# Patient Record
Sex: FEMALE | Race: WHITE | NOT HISPANIC OR LATINO | Employment: PART TIME | URBAN - METROPOLITAN AREA
[De-identification: names, ages, dates, MRNs, and addresses within clinical notes are randomized per-mention and may not be internally consistent; named-entity substitution may affect disease eponyms.]

---

## 2018-05-23 ENCOUNTER — LAB VISIT (OUTPATIENT)
Dept: LAB | Facility: HOSPITAL | Age: 28
End: 2018-05-23
Attending: INTERNAL MEDICINE
Payer: COMMERCIAL

## 2018-05-23 ENCOUNTER — OFFICE VISIT (OUTPATIENT)
Dept: FAMILY MEDICINE | Facility: CLINIC | Age: 28
End: 2018-05-23
Payer: COMMERCIAL

## 2018-05-23 VITALS
HEART RATE: 82 BPM | SYSTOLIC BLOOD PRESSURE: 100 MMHG | BODY MASS INDEX: 20.97 KG/M2 | HEIGHT: 66 IN | WEIGHT: 130.5 LBS | DIASTOLIC BLOOD PRESSURE: 60 MMHG | TEMPERATURE: 99 F

## 2018-05-23 DIAGNOSIS — E55.9 VITAMIN D DEFICIENCY: ICD-10-CM

## 2018-05-23 DIAGNOSIS — Z87.42 HISTORY OF OVARIAN CYST: ICD-10-CM

## 2018-05-23 DIAGNOSIS — D22.9 NUMEROUS MOLES: ICD-10-CM

## 2018-05-23 DIAGNOSIS — Z11.3 SCREENING FOR STD (SEXUALLY TRANSMITTED DISEASE): ICD-10-CM

## 2018-05-23 DIAGNOSIS — Z12.83 SKIN EXAM, SCREENING FOR CANCER: ICD-10-CM

## 2018-05-23 DIAGNOSIS — N94.6 DYSMENORRHEA: ICD-10-CM

## 2018-05-23 DIAGNOSIS — Z00.00 ANNUAL PHYSICAL EXAM: Primary | ICD-10-CM

## 2018-05-23 DIAGNOSIS — Z00.00 ANNUAL PHYSICAL EXAM: ICD-10-CM

## 2018-05-23 LAB
25(OH)D3+25(OH)D2 SERPL-MCNC: 34 NG/ML
ALBUMIN SERPL BCP-MCNC: 4.3 G/DL
ALP SERPL-CCNC: 43 U/L
ALT SERPL W/O P-5'-P-CCNC: 9 U/L
ANION GAP SERPL CALC-SCNC: 11 MMOL/L
AST SERPL-CCNC: 14 U/L
BASOPHILS # BLD AUTO: 0.06 K/UL
BASOPHILS NFR BLD: 0.6 %
BILIRUB SERPL-MCNC: 1 MG/DL
BUN SERPL-MCNC: 11 MG/DL
C TRACH DNA SPEC QL NAA+PROBE: NOT DETECTED
CALCIUM SERPL-MCNC: 9.3 MG/DL
CHLORIDE SERPL-SCNC: 106 MMOL/L
CHOLEST SERPL-MCNC: 196 MG/DL
CHOLEST/HDLC SERPL: 3.2 {RATIO}
CO2 SERPL-SCNC: 23 MMOL/L
CREAT SERPL-MCNC: 0.9 MG/DL
DIFFERENTIAL METHOD: NORMAL
EOSINOPHIL # BLD AUTO: 0.3 K/UL
EOSINOPHIL NFR BLD: 2.6 %
ERYTHROCYTE [DISTWIDTH] IN BLOOD BY AUTOMATED COUNT: 12.2 %
EST. GFR  (AFRICAN AMERICAN): >60 ML/MIN/1.73 M^2
EST. GFR  (NON AFRICAN AMERICAN): >60 ML/MIN/1.73 M^2
ESTIMATED AVG GLUCOSE: 94 MG/DL
GLUCOSE SERPL-MCNC: 88 MG/DL
HBA1C MFR BLD HPLC: 4.9 %
HCT VFR BLD AUTO: 39.6 %
HDLC SERPL-MCNC: 61 MG/DL
HDLC SERPL: 31.1 %
HGB BLD-MCNC: 13.1 G/DL
IMM GRANULOCYTES # BLD AUTO: 0.02 K/UL
IMM GRANULOCYTES NFR BLD AUTO: 0.2 %
LDLC SERPL CALC-MCNC: 120.6 MG/DL
LYMPHOCYTES # BLD AUTO: 1.9 K/UL
LYMPHOCYTES NFR BLD: 18.5 %
MCH RBC QN AUTO: 30.8 PG
MCHC RBC AUTO-ENTMCNC: 33.1 G/DL
MCV RBC AUTO: 93 FL
MONOCYTES # BLD AUTO: 0.7 K/UL
MONOCYTES NFR BLD: 7.2 %
N GONORRHOEA DNA SPEC QL NAA+PROBE: NOT DETECTED
NEUTROPHILS # BLD AUTO: 7.3 K/UL
NEUTROPHILS NFR BLD: 70.9 %
NONHDLC SERPL-MCNC: 135 MG/DL
NRBC BLD-RTO: 0 /100 WBC
PLATELET # BLD AUTO: 194 K/UL
PMV BLD AUTO: 10 FL
POTASSIUM SERPL-SCNC: 4 MMOL/L
PROT SERPL-MCNC: 7.3 G/DL
RBC # BLD AUTO: 4.25 M/UL
SODIUM SERPL-SCNC: 140 MMOL/L
TRIGL SERPL-MCNC: 72 MG/DL
WBC # BLD AUTO: 10.29 K/UL

## 2018-05-23 PROCEDURE — 86703 HIV-1/HIV-2 1 RESULT ANTBDY: CPT

## 2018-05-23 PROCEDURE — 85025 COMPLETE CBC W/AUTO DIFF WBC: CPT

## 2018-05-23 PROCEDURE — 82306 VITAMIN D 25 HYDROXY: CPT

## 2018-05-23 PROCEDURE — 87491 CHLMYD TRACH DNA AMP PROBE: CPT

## 2018-05-23 PROCEDURE — 36415 COLL VENOUS BLD VENIPUNCTURE: CPT | Mod: PO

## 2018-05-23 PROCEDURE — 83036 HEMOGLOBIN GLYCOSYLATED A1C: CPT

## 2018-05-23 PROCEDURE — 80053 COMPREHEN METABOLIC PANEL: CPT

## 2018-05-23 PROCEDURE — 86592 SYPHILIS TEST NON-TREP QUAL: CPT

## 2018-05-23 PROCEDURE — 99999 PR PBB SHADOW E&M-NEW PATIENT-LVL III: CPT | Mod: PBBFAC,,, | Performed by: INTERNAL MEDICINE

## 2018-05-23 PROCEDURE — 80061 LIPID PANEL: CPT

## 2018-05-23 PROCEDURE — 99385 PREV VISIT NEW AGE 18-39: CPT | Mod: S$GLB,,, | Performed by: INTERNAL MEDICINE

## 2018-05-23 RX ORDER — MULTIVITAMIN
1 TABLET ORAL DAILY
COMMUNITY

## 2018-05-23 NOTE — PROGRESS NOTES
Subjective:        Patient ID: Tanmay Boucher is a 28 y.o. female.    Chief Complaint: Annual Exam    HPI   Tanmay Boucher presents for annual exam and to establish care.    Pt reports hx of ovarian cyst.  She gets regular but painful periods, especially the first 2 days.  She asks about endometriosis evaluation because her sister and GM both have it.  Pt has taken OCPs in the past and didn't like the mood changes and weight gain associated with the pill.  She has not tried other forms of birth control.  She took Aleve for the cramping and that helps.    Pt would like to see a dermatologist for full skin exam and to have some moles examined.    Review of Systems   Constitutional: Negative for activity change and unexpected weight change.   HENT: Negative for ear pain, hearing loss, sore throat and trouble swallowing.    Eyes: Negative for visual disturbance (no glasses or contacts).   Respiratory: Negative for chest tightness and shortness of breath.    Cardiovascular: Negative for chest pain and leg swelling.   Gastrointestinal: Negative for abdominal pain, blood in stool, constipation and diarrhea.        Indigestion   Genitourinary: Negative for difficulty urinating, hematuria, menstrual problem and vaginal discharge.   Musculoskeletal: Positive for back pain.   Skin: Negative for rash.   Allergic/Immunologic: Negative for immunocompromised state.   Neurological: Negative for dizziness and headaches.   Hematological: Does not bruise/bleed easily.   Psychiatric/Behavioral: Negative for dysphoric mood and sleep disturbance. The patient is not nervous/anxious.         - grieving mother's death 3 weeks ago           Objective:        Vitals:    05/23/18 1334   BP: 100/60   Pulse: 82   Temp: 98.5 °F (36.9 °C)     Physical Exam   Constitutional: She is oriented to person, place, and time. She appears well-developed and well-nourished. No distress.   HENT:   Head: Normocephalic and atraumatic.   Right Ear: External ear  normal.   Left Ear: External ear normal.   Nose: Nose normal.   - bilateral ear canals clear, tympanic membranes visualized - normal color and light reflex   Eyes: Conjunctivae and EOM are normal.   Neck: Normal range of motion. Neck supple. No thyromegaly present.   Cardiovascular: Normal rate, regular rhythm and normal heart sounds.    No murmur heard.  Pulmonary/Chest: Effort normal and breath sounds normal. No respiratory distress.   Abdominal: She exhibits no distension.   Musculoskeletal: She exhibits no edema.   Lymphadenopathy:     She has no cervical adenopathy.   Neurological: She is alert and oriented to person, place, and time.   Skin: Skin is warm and dry.   Psychiatric: She has a normal mood and affect. Her behavior is normal. Judgment and thought content normal.   Vitals reviewed.          Assessment:         1. Annual physical exam    2. Skin exam, screening for cancer    3. Numerous moles    4. History of ovarian cyst    5. Dysmenorrhea    6. Vitamin D deficiency    7. Screening for STD (sexually transmitted disease)              Plan:         Tanmay was seen today for annual exam.    Diagnoses and all orders for this visit:    Annual physical exam  - labs, STI screening today  - pt will return for pap smear since she started her period today  - reports last tetanus ~9 years ago, will get booster next year  -     CBC auto differential; Future  -     Comprehensive metabolic panel; Future  -     Lipid panel; Future  -     Hemoglobin A1c; Future    Skin exam, screening for cancer  Numerous moles: referral to derm  -     Ambulatory referral to Dermatology    History of ovarian cyst  Dysmenorrhea: Pt has has US that showed no further cysts after one that ruptured.  Current cycle and last month's cycle were not that bad, pain improves with NSAIDs.  We discussed trying NuvaRing instead of OCP and pt will think about it, let me know if she would like to try this for the severe PMS symptoms.  Counseled pt  treatment for severe PMS and endometriosis both begin with NSAIDs and birth control before recommending laparoscopy to evaluate for endometriosis.    Vitamin D deficiency: Pt is not currently taking a supplement, has required a high dose supplement in the past.  Will check Vitamin D level.  -     Vitamin D; Future    Screening for STD (sexually transmitted disease)  -     C. trachomatis/N. gonorrhoeae by AMP DNA Urine  -     HIV-1 and HIV-2 antibodies; Future  -     RPR; Future        Follow-up for pending lab results.

## 2018-05-24 LAB
HIV 1+2 AB+HIV1 P24 AG SERPL QL IA: NEGATIVE
RPR SER QL: NORMAL

## 2018-05-27 ENCOUNTER — OFFICE VISIT (OUTPATIENT)
Dept: URGENT CARE | Facility: CLINIC | Age: 28
End: 2018-05-27
Payer: COMMERCIAL

## 2018-05-27 VITALS
TEMPERATURE: 98 F | DIASTOLIC BLOOD PRESSURE: 64 MMHG | SYSTOLIC BLOOD PRESSURE: 103 MMHG | HEART RATE: 77 BPM | HEIGHT: 66 IN | OXYGEN SATURATION: 99 % | WEIGHT: 130 LBS | BODY MASS INDEX: 20.89 KG/M2

## 2018-05-27 DIAGNOSIS — Z91.038 ALLERGIC REACTION TO INSECT BITE: Primary | ICD-10-CM

## 2018-05-27 PROCEDURE — 99214 OFFICE O/P EST MOD 30 MIN: CPT | Mod: S$GLB,,, | Performed by: NURSE PRACTITIONER

## 2018-05-27 PROCEDURE — 3008F BODY MASS INDEX DOCD: CPT | Mod: CPTII,S$GLB,, | Performed by: NURSE PRACTITIONER

## 2018-05-27 RX ORDER — HYDROCORTISONE 1 %
CREAM (GRAM) TOPICAL
Qty: 30 G | Refills: 1 | Status: SHIPPED | OUTPATIENT
Start: 2018-05-27 | End: 2023-03-09

## 2018-05-27 RX ORDER — PREDNISONE 20 MG/1
40 TABLET ORAL DAILY
Qty: 10 TABLET | Refills: 0 | Status: SHIPPED | OUTPATIENT
Start: 2018-05-27 | End: 2018-06-01

## 2018-05-27 NOTE — PROGRESS NOTES
"Subjective:       Patient ID: Tanmay Boucher is a 28 y.o. female.    Vitals:  height is 5' 6" (1.676 m) and weight is 59 kg (130 lb). Her oral temperature is 98.3 °F (36.8 °C). Her blood pressure is 103/64 and her pulse is 77. Her oxygen saturation is 99%.     Chief Complaint: Insect Bite    Stung by a bee yesterday      Insect Bite   This is a new problem. The current episode started yesterday. The problem has been unchanged. Associated symptoms include joint swelling. Pertinent negatives include no chills, fever, rash or sore throat. Nothing aggravates the symptoms.     Review of Systems   Constitution: Negative for chills and fever.   HENT: Negative for sore throat.    Respiratory: Negative for shortness of breath.    Skin: Positive for flushing and itching. Negative for rash.   Musculoskeletal: Positive for joint swelling. Negative for joint pain.       Objective:      Physical Exam   Constitutional: She is oriented to person, place, and time. She appears well-developed and well-nourished.   HENT:   Head: Normocephalic and atraumatic. Head is without abrasion, without contusion and without laceration.   Nose: Nose normal.   Eyes: Conjunctivae, EOM and lids are normal. Pupils are equal, round, and reactive to light.   Neck: Trachea normal, full passive range of motion without pain and phonation normal. Neck supple.   Cardiovascular: Normal rate, regular rhythm and normal heart sounds.    Pulmonary/Chest: Effort normal and breath sounds normal. No stridor. No respiratory distress.   Musculoskeletal: Normal range of motion.   Neurological: She is alert and oriented to person, place, and time.   Skin: Skin is warm, dry and intact. Capillary refill takes less than 2 seconds. Rash noted. No abrasion, no bruising, no burn, no ecchymosis, no laceration and no lesion noted. There is erythema.        Psychiatric: She has a normal mood and affect. Her speech is normal and behavior is normal. Judgment and thought content " normal. Cognition and memory are normal.   Nursing note and vitals reviewed.      Assessment:       1. Allergic reaction to insect bite        Plan:       Patient Instructions     Benadryl OTC as directed for 7 days    Claritin, Zyrtec, or Allegra OTC as directed for 7 days      Insect Sting Allergy, Generalized  You are having an allergic reaction to an insect sting. This may occur after a sting by a wasp, honeybee, yellow jacket, or other insect. This may cause an itchy rash and swelling in the face or other parts of the body. A more severe reaction may cause you to feel dizzy, faint, or have trouble breathing or swallowing. Other warning signs are listed below.  Symptoms can include:  · Rash, hives, redness, welts, or blisters in areas other than the sting site  · Itching, burning, stinging, pain in areas other than the sting site  · Dry, flaky, cracking, scaly skin  · Swelling in areas other than the sting site   · Stomach pain or cramps  More severe symptoms include:  · Swelling of the face or lips or drooling  · Trouble swallowing, feeling like your throat is closing  · Trouble breathing, wheezing  · Dizziness or a sudden decrease in blood pressure  · Hoarse voice or trouble speaking  · Severe nausea, vomiting, or diarrhea  · Feeling faint or lightheaded  · Rapid heart rate  Home care  Medicine  The healthcare provider may prescribe medicines to relieve swelling, itching, and pain. Follow the providers instructions when taking these medicines.  · If you had a severe reaction, the provider may prescribe an injectable epinephrine kit. Epinephrine will stop the progression of an allergic reaction. Before you leave the hospital, be sure that you understand when and how to use this medicine.  · Oral diphenhydramine is an over-the-counter antihistamine available at pharmacies and grocery stores. Unless a prescription antihistamine was given, diphenhydramine may be used to reduce itching if large areas of the skin  are involved. It may make you sleepy, so be careful using it in the daytime or when going to school, working, or driving. Note: Dont use diphenhydramine if you have glaucoma or if you are a man with trouble urinating due to an enlarged prostate. There are other antihistamines that cause less drowsiness and are good choices for daytime use. Ask your pharmacist for suggestions.  · Dont use diphenhydramine cream on your skin. It can cause a further reaction in some people.  · Calamine lotion or oatmeal baths sometimes help with itching.  · You may use acetaminophen or ibuprofen to control pain, unless another pain medicine was prescribed. Note: If you have chronic liver or kidney disease or ever had a stomach ulcer or gastrointestinal bleeding, talk with your provider before using these medicines.    General care  Avoid tight clothing and things that heat up your skin (such as hot showers or baths, or direct sunlight). Heat makes the itching worse.  An ice pack will relieve local areas of intense itching and redness. Apply 5 to 10 minutes. To make an ice pack, put ice cubes in a plastic bag that seals at the top. Wrap the bag in a clean, thin towel or cloth. Dont put ice directly on the skin.  Ticks  If you try to remove a tick, do the following:  · Use a set of fine tweezers and  the tick as close to the skin as is possible.  · Pull upwards, using even, steady pressure. Dont jerk or twist the tick. The ticks bodily fluids may contain infection-causing organisms. So dont squeeze, crush, or puncture the body of the tick. Dont use a smoldering match or cigarette, nail polish, petroleum jelly, liquid soap, or kerosene. They may irritate the tick.  · If any mouthparts of the tick remain in the skin, these can be removed with tweezers. If you cant remove the mouth (of a tick) easily with clean tweezers, leave it alone and let the skin heal.  · After the tick is removed, wash the bite area with rubbing alcohol,  iodine, or soap and water.  · Put the tick in a sealed container and completely cover it with alcohol. Never try to kill or crush a tick with your hand or fingers.  Stings  Wasps, yellow jackets, and hornets dont leave a stinger behind. But if a honeybee stings you, a stinger may stay in your skin. The stinger of a honeybee releases a substance that will attract other bees to you. So try to move away from the nest immediately. Once you are away from the nest, then remove the stinger as quickly as possible by:  · Scraping the stinger out with the edge of a dull knife or plastic card (credit card).  · Don't use a tweezer or your fingers to remove the stinger since that may squeeze more toxin from the stinger.  · Wash the affected area with soap and warm water 2 to 3 times a day. Don't break a blister, if present.  · Next apply an ice pack for 5 to 10 minutes. To make an ice pack, put ice cubes in a plastic bag that seals at the top. Wrap the bag in a clean, thin towel or cloth. Dont put ice directly on the skin.  · Contact your healthcare provider and ask what can be used to help decrease the swelling and itching to the affected area.   · To prevent an infection, don't scratch the affected areas. Always check the sting area for signs of an infection: increased redness, swelling, or pain to the affected area.  Preventing future reactions  Future reactions could be worse than this one. So try to avoid situations where you might be stung:  · Don't walk in grass without shoes. Avoid wearing sandals.  · Don't leave food uncovered when eating outside. Sweet treats, watermelon, and ice cream attract insects.  · Don't drink from uncovered sweetened drinks in cans when outside. Insects are attracted to soda drink cans and sometimes crawl inside of them.  · Don't wear bright colored clothes with flowery prints and patterns when outside.  · Dont wear perfume when outside. Smell attracts insects.  · Wear long pants,  long-sleeved shirts, socks, and work gloves when working outside.  · Be aware that honeybees nest in trees. Wasps and yellow jackets nest in the ground, trees or roof eaves. Avoid garbage cans when outside.  Auto-injectable epinephrine  · If you are at high risk for another sting due to where you work or play, or if your reaction included dizziness, fainting or trouble breathing or swallowing, an auto-injectable epinephrine may be prescribed. If not, ask your healthcare provider for one and always carry it with you. Learn how to use the device. If you begin to feel the symptoms of another reaction in the future, use the auto-injectable epinephrine to inject yourself, and then call 911. Don't wait until symptoms become severe.   · Remember that the auto-injectable epinephrine is a rescue medicine only. You still need someone to take you to the hospital or call 911 after you have received the medicine.  Follow-up care  Follow up with your healthcare provider, or as advised if your symptoms do not continue to improve.  Call 911  Call 911 if any of these occur:  · Trouble breathing or swallowing, wheezing   · Cool, moist, pale skin  · Hoarse voice or trouble speaking  · Confused  · Very drowsy or trouble waking up  · Fainting or loss of consciousness  · Rapid heart rate  · Low blood pressure or feeling dizzy or weak  · Feeling of doom  · Severe nausea, vomiting, or diarrhea  · Seizure  · Swelling in the face, eyelids, lips, mouth, throat or tongue  · Drooling  When to seek medical advice  Call your healthcare provider right away if any of the following occur:  · Spreading areas of itching, redness or swelling  · Headache, fever, chills, muscle or joint aching  · Increased pain or swelling  · Signs of infection of the affected area:  ¨ Spreading redness  ¨ Increase in pain or swelling  ¨ Fluid or colored drainage from the affected site  Date Last Reviewed: 3/1/2017  © 8187-0718 [employer]. [address] [place] [address]  [place] [address]. All rights reserved. This information is not intended as a substitute for professional medical care. Always follow your healthcare professional's instructions.              Allergic reaction to insect bite    Other orders  -     predniSONE (DELTASONE) 20 MG tablet; Take 2 tablets (40 mg total) by mouth once daily.  Dispense: 10 tablet; Refill: 0  -     hydrocortisone 1 % cream; Apply to affected area 2 times daily  Dispense: 30 g; Refill: 1

## 2018-05-27 NOTE — PATIENT INSTRUCTIONS
Benadryl OTC as directed for 7 days    Claritin, Zyrtec, or Allegra OTC as directed for 7 days      Insect Sting Allergy, Generalized  You are having an allergic reaction to an insect sting. This may occur after a sting by a wasp, honeybee, yellow jacket, or other insect. This may cause an itchy rash and swelling in the face or other parts of the body. A more severe reaction may cause you to feel dizzy, faint, or have trouble breathing or swallowing. Other warning signs are listed below.  Symptoms can include:  · Rash, hives, redness, welts, or blisters in areas other than the sting site  · Itching, burning, stinging, pain in areas other than the sting site  · Dry, flaky, cracking, scaly skin  · Swelling in areas other than the sting site   · Stomach pain or cramps  More severe symptoms include:  · Swelling of the face or lips or drooling  · Trouble swallowing, feeling like your throat is closing  · Trouble breathing, wheezing  · Dizziness or a sudden decrease in blood pressure  · Hoarse voice or trouble speaking  · Severe nausea, vomiting, or diarrhea  · Feeling faint or lightheaded  · Rapid heart rate  Home care  Medicine  The healthcare provider may prescribe medicines to relieve swelling, itching, and pain. Follow the providers instructions when taking these medicines.  · If you had a severe reaction, the provider may prescribe an injectable epinephrine kit. Epinephrine will stop the progression of an allergic reaction. Before you leave the hospital, be sure that you understand when and how to use this medicine.  · Oral diphenhydramine is an over-the-counter antihistamine available at pharmacies and grocery stores. Unless a prescription antihistamine was given, diphenhydramine may be used to reduce itching if large areas of the skin are involved. It may make you sleepy, so be careful using it in the daytime or when going to school, working, or driving. Note: Dont use diphenhydramine if you have glaucoma or  if you are a man with trouble urinating due to an enlarged prostate. There are other antihistamines that cause less drowsiness and are good choices for daytime use. Ask your pharmacist for suggestions.  · Dont use diphenhydramine cream on your skin. It can cause a further reaction in some people.  · Calamine lotion or oatmeal baths sometimes help with itching.  · You may use acetaminophen or ibuprofen to control pain, unless another pain medicine was prescribed. Note: If you have chronic liver or kidney disease or ever had a stomach ulcer or gastrointestinal bleeding, talk with your provider before using these medicines.    General care  Avoid tight clothing and things that heat up your skin (such as hot showers or baths, or direct sunlight). Heat makes the itching worse.  An ice pack will relieve local areas of intense itching and redness. Apply 5 to 10 minutes. To make an ice pack, put ice cubes in a plastic bag that seals at the top. Wrap the bag in a clean, thin towel or cloth. Dont put ice directly on the skin.  Ticks  If you try to remove a tick, do the following:  · Use a set of fine tweezers and  the tick as close to the skin as is possible.  · Pull upwards, using even, steady pressure. Dont jerk or twist the tick. The ticks bodily fluids may contain infection-causing organisms. So dont squeeze, crush, or puncture the body of the tick. Dont use a smoldering match or cigarette, nail polish, petroleum jelly, liquid soap, or kerosene. They may irritate the tick.  · If any mouthparts of the tick remain in the skin, these can be removed with tweezers. If you cant remove the mouth (of a tick) easily with clean tweezers, leave it alone and let the skin heal.  · After the tick is removed, wash the bite area with rubbing alcohol, iodine, or soap and water.  · Put the tick in a sealed container and completely cover it with alcohol. Never try to kill or crush a tick with your hand or fingers.  Stings  Wasps,  yellow jackets, and hornets dont leave a stinger behind. But if a honeybee stings you, a stinger may stay in your skin. The stinger of a honeybee releases a substance that will attract other bees to you. So try to move away from the nest immediately. Once you are away from the nest, then remove the stinger as quickly as possible by:  · Scraping the stinger out with the edge of a dull knife or plastic card (credit card).  · Don't use a tweezer or your fingers to remove the stinger since that may squeeze more toxin from the stinger.  · Wash the affected area with soap and warm water 2 to 3 times a day. Don't break a blister, if present.  · Next apply an ice pack for 5 to 10 minutes. To make an ice pack, put ice cubes in a plastic bag that seals at the top. Wrap the bag in a clean, thin towel or cloth. Dont put ice directly on the skin.  · Contact your healthcare provider and ask what can be used to help decrease the swelling and itching to the affected area.   · To prevent an infection, don't scratch the affected areas. Always check the sting area for signs of an infection: increased redness, swelling, or pain to the affected area.  Preventing future reactions  Future reactions could be worse than this one. So try to avoid situations where you might be stung:  · Don't walk in grass without shoes. Avoid wearing sandals.  · Don't leave food uncovered when eating outside. Sweet treats, watermelon, and ice cream attract insects.  · Don't drink from uncovered sweetened drinks in cans when outside. Insects are attracted to soda drink cans and sometimes crawl inside of them.  · Don't wear bright colored clothes with flowery prints and patterns when outside.  · Dont wear perfume when outside. Smell attracts insects.  · Wear long pants, long-sleeved shirts, socks, and work gloves when working outside.  · Be aware that honeybees nest in trees. Wasps and yellow jackets nest in the ground, trees or roof eaves. Avoid garbage  cans when outside.  Auto-injectable epinephrine  · If you are at high risk for another sting due to where you work or play, or if your reaction included dizziness, fainting or trouble breathing or swallowing, an auto-injectable epinephrine may be prescribed. If not, ask your healthcare provider for one and always carry it with you. Learn how to use the device. If you begin to feel the symptoms of another reaction in the future, use the auto-injectable epinephrine to inject yourself, and then call 911. Don't wait until symptoms become severe.   · Remember that the auto-injectable epinephrine is a rescue medicine only. You still need someone to take you to the hospital or call 911 after you have received the medicine.  Follow-up care  Follow up with your healthcare provider, or as advised if your symptoms do not continue to improve.  Call 911  Call 911 if any of these occur:  · Trouble breathing or swallowing, wheezing   · Cool, moist, pale skin  · Hoarse voice or trouble speaking  · Confused  · Very drowsy or trouble waking up  · Fainting or loss of consciousness  · Rapid heart rate  · Low blood pressure or feeling dizzy or weak  · Feeling of doom  · Severe nausea, vomiting, or diarrhea  · Seizure  · Swelling in the face, eyelids, lips, mouth, throat or tongue  · Drooling  When to seek medical advice  Call your healthcare provider right away if any of the following occur:  · Spreading areas of itching, redness or swelling  · Headache, fever, chills, muscle or joint aching  · Increased pain or swelling  · Signs of infection of the affected area:  ¨ Spreading redness  ¨ Increase in pain or swelling  ¨ Fluid or colored drainage from the affected site  Date Last Reviewed: 3/1/2017  © 4064-6844 [employer]. [address] [place] [address]. All rights reserved. This information is not intended as a substitute for professional medical care. Always follow your healthcare professional's  instructions.

## 2018-05-30 ENCOUNTER — OFFICE VISIT (OUTPATIENT)
Dept: FAMILY MEDICINE | Facility: CLINIC | Age: 28
End: 2018-05-30
Payer: COMMERCIAL

## 2018-05-30 VITALS
TEMPERATURE: 98 F | WEIGHT: 121.5 LBS | BODY MASS INDEX: 19.53 KG/M2 | HEIGHT: 66 IN | RESPIRATION RATE: 20 BRPM | SYSTOLIC BLOOD PRESSURE: 100 MMHG | DIASTOLIC BLOOD PRESSURE: 68 MMHG

## 2018-05-30 DIAGNOSIS — Z23 NEED FOR TDAP VACCINATION: ICD-10-CM

## 2018-05-30 DIAGNOSIS — N89.8 VAGINAL ITCHING: ICD-10-CM

## 2018-05-30 DIAGNOSIS — Z12.4 SCREENING FOR CERVICAL CANCER: Primary | ICD-10-CM

## 2018-05-30 LAB
CANDIDA RRNA VAG QL PROBE: NEGATIVE
G VAGINALIS RRNA GENITAL QL PROBE: NEGATIVE
T VAGINALIS RRNA GENITAL QL PROBE: NEGATIVE

## 2018-05-30 PROCEDURE — 99999 PR PBB SHADOW E&M-EST. PATIENT-LVL III: CPT | Mod: PBBFAC,,, | Performed by: INTERNAL MEDICINE

## 2018-05-30 PROCEDURE — 3008F BODY MASS INDEX DOCD: CPT | Mod: CPTII,S$GLB,, | Performed by: INTERNAL MEDICINE

## 2018-05-30 PROCEDURE — 87510 GARDNER VAG DNA DIR PROBE: CPT

## 2018-05-30 PROCEDURE — 90471 IMMUNIZATION ADMIN: CPT | Mod: S$GLB,,, | Performed by: INTERNAL MEDICINE

## 2018-05-30 PROCEDURE — 90715 TDAP VACCINE 7 YRS/> IM: CPT | Mod: S$GLB,,, | Performed by: INTERNAL MEDICINE

## 2018-05-30 PROCEDURE — 87480 CANDIDA DNA DIR PROBE: CPT

## 2018-05-30 PROCEDURE — 88141 CYTOPATH C/V INTERPRET: CPT | Mod: ,,, | Performed by: PATHOLOGY

## 2018-05-30 PROCEDURE — 99213 OFFICE O/P EST LOW 20 MIN: CPT | Mod: 25,S$GLB,, | Performed by: INTERNAL MEDICINE

## 2018-05-30 PROCEDURE — 88175 CYTOPATH C/V AUTO FLUID REDO: CPT | Performed by: PATHOLOGY

## 2018-05-30 PROCEDURE — 87624 HPV HI-RISK TYP POOLED RSLT: CPT

## 2018-05-30 NOTE — PROGRESS NOTES
Two patient identifiers verified.  Allergies reviewed.Tdap   IM administered to Left deltoid per MD order.  Patient tolerated injection well; no redness, bleeding, or bruising noted to injection site.  Patient instructed to remain in clinic setting for 15 minutes.  Verbalizes understanding.

## 2018-05-30 NOTE — PROGRESS NOTES
Subjective:        Patient ID: Tanmay Boucher is a 28 y.o. female.    Chief Complaint: Gynecologic Exam and Vaginal Itching    HPI   Tanmay Boucher presents for cervical CA screening.  Pt was on her period during annual physical exam and deferred screening that day.    Pt notes some vaginal itching that has improved/resolved.  No abnormal discharge.  Baseline vaginal discharge is off white or light yellow-green.    Review of Systems  as per HPI      Objective:        Vitals:    05/30/18 1049   BP: 100/68   Resp: 20   Temp: 98.2 °F (36.8 °C)     Physical Exam   Constitutional: She appears well-developed and well-nourished. No distress.   Genitourinary: Pelvic exam was performed with patient supine. There is no rash, tenderness, lesion or injury on the right labia. There is no rash, tenderness, lesion or injury on the left labia. Cervix exhibits no friability. No erythema, tenderness or bleeding in the vagina. No foreign body in the vagina. No signs of injury around the vagina. Vaginal discharge (light yellow discharge) found.   Vitals reviewed.          Assessment:         1. Screening for cervical cancer    2. Vaginal itching    3. Need for Tdap vaccination              Plan:         Tanmay was seen today for gynecologic exam and vaginal itching.    Diagnoses and all orders for this visit:    Screening for cervical cancer: No hx of abnormal pap smears.  Reviewed cervical CA screening guidelines.  -     Liquid-based pap smear, screening    Vaginal itching: Discharge on exam may be physiologic.  Will hold tx pending results of swab.  -     Vaginosis Screen by DNA Probe      Vaccinations: Pt brought vaccine records.  Last tetanus 2007, will get booster today.  Copy of vaccination record will be sent to scan into pt's chart.

## 2018-06-01 ENCOUNTER — PATIENT MESSAGE (OUTPATIENT)
Dept: FAMILY MEDICINE | Facility: CLINIC | Age: 28
End: 2018-06-01

## 2018-06-06 ENCOUNTER — TELEPHONE (OUTPATIENT)
Dept: DERMATOLOGY | Facility: CLINIC | Age: 28
End: 2018-06-06

## 2018-06-06 NOTE — TELEPHONE ENCOUNTER
Called PT and informed her that Dr. CARRION would not be in clinic on her appointment day 6/29. Offered to move PT to 6/22 but she states that she would be out of town so she was moved to 7/2.

## 2018-06-14 ENCOUNTER — TELEPHONE (OUTPATIENT)
Dept: FAMILY MEDICINE | Facility: CLINIC | Age: 28
End: 2018-06-14

## 2018-06-14 DIAGNOSIS — R87.610 ASCUS OF CERVIX WITH NEGATIVE HIGH RISK HPV: ICD-10-CM

## 2018-06-14 LAB
HPV HR 12 DNA CVX QL NAA+PROBE: NEGATIVE
HPV16 AG SPEC QL: NEGATIVE
HPV18 DNA SPEC QL NAA+PROBE: NEGATIVE

## 2018-06-15 ENCOUNTER — PATIENT MESSAGE (OUTPATIENT)
Dept: FAMILY MEDICINE | Facility: CLINIC | Age: 28
End: 2018-06-15

## 2018-07-02 ENCOUNTER — INITIAL CONSULT (OUTPATIENT)
Dept: DERMATOLOGY | Facility: CLINIC | Age: 28
End: 2018-07-02
Payer: COMMERCIAL

## 2018-07-02 DIAGNOSIS — D22.70 MULTIPLE BENIGN MELANOCYTIC NEVI OF UPPER EXTREMITY, LOWER EXTREMITY, AND TRUNK: ICD-10-CM

## 2018-07-02 DIAGNOSIS — D22.60 MULTIPLE BENIGN MELANOCYTIC NEVI OF UPPER EXTREMITY, LOWER EXTREMITY, AND TRUNK: ICD-10-CM

## 2018-07-02 DIAGNOSIS — L81.1 MELASMA: Primary | ICD-10-CM

## 2018-07-02 DIAGNOSIS — Z80.8 FAMILY HISTORY OF NONMELANOMA SKIN CANCER: ICD-10-CM

## 2018-07-02 DIAGNOSIS — D22.5 MULTIPLE BENIGN MELANOCYTIC NEVI OF UPPER EXTREMITY, LOWER EXTREMITY, AND TRUNK: ICD-10-CM

## 2018-07-02 DIAGNOSIS — D48.5 NEOPLASM OF UNCERTAIN BEHAVIOR OF SKIN: ICD-10-CM

## 2018-07-02 DIAGNOSIS — L81.4 LENTIGO: ICD-10-CM

## 2018-07-02 PROCEDURE — 99203 OFFICE O/P NEW LOW 30 MIN: CPT | Mod: S$GLB,,, | Performed by: DERMATOLOGY

## 2018-07-02 NOTE — PROGRESS NOTES
Subjective:       Patient ID:  Tanmay Boucher is a 28 y.o. female who presents for   Chief Complaint   Patient presents with    Growth     R upper inner thigh     Growth  - Initial  Affected locations: right upper leg  Duration: 20 years  Signs and Symptoms: grown with her.  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   Skin: Positive for daily sunscreen use (SPF 50 on face), activity-related sunscreen use and recent sunburn (mild).   Hematologic/Lymphatic: Bruises/bleeds easily (bruising, unknown cause).        Objective:    Physical Exam   Constitutional: She appears well-developed and well-nourished. No distress.   Neurological: She is alert and oriented to person, place, and time. She is not disoriented.   Psychiatric: She has a normal mood and affect.   Skin:   Areas Examined (abnormalities noted in diagram):   Scalp / Hair Palpated and Inspected  Head / Face Inspection Performed  Neck Inspection Performed  Chest / Axilla Inspection Performed  Abdomen Inspection Performed  Genitals / Buttocks / Groin Inspection Performed  Back Inspection Performed  RUE Inspected  LUE Inspection Performed  RLE Inspected  LLE Inspection Performed  Nails and Digits Inspection Performed                   Diagram Legend     Erythematous scaling macule/papule c/w actinic keratosis       Vascular papule c/w angioma      Pigmented verrucoid papule/plaque c/w seborrheic keratosis      Yellow umbilicated papule c/w sebaceous hyperplasia      Irregularly shaped tan macule c/w lentigo     1-2 mm smooth white papules consistent with Milia      Movable subcutaneous cyst with punctum c/w epidermal inclusion cyst      Subcutaneous movable cyst c/w pilar cyst      Firm pink to brown papule c/w dermatofibroma      Pedunculated fleshy papule(s) c/w skin  tag(s)      Evenly pigmented macule c/w junctional nevus     Mildly variegated pigmented, slightly irregular-bordered macule c/w mildly atypical nevus      Flesh colored to evenly pigmented papule c/w intradermal nevus       Pink pearly papule/plaque c/w basal cell carcinoma      Erythematous hyperkeratotic cursted plaque c/w SCC      Surgical scar with no sign of skin cancer recurrence      Open and closed comedones      Inflammatory papules and pustules      Verrucoid papule consistent consistent with wart     Erythematous eczematous patches and plaques     Dystrophic onycholytic nail with subungual debris c/w onychomycosis     Umbilicated papule    Erythematous-base heme-crusted tan verrucoid plaque consistent with inflamed seborrheic keratosis     Erythematous Silvery Scaling Plaque c/w Psoriasis     See annotation      Assessment / Plan:        Melasma  Discussed hormonal, solar, and genetic etiology. Emphasized difficulty in treatment and maintaining improvement. Discussed importance of diligent daily sun protection.  Patient instructed on importance of daily sun protection with a broad-spectrum sunscreen of SPF 30 or higher. Sun avoidance and topical protection discussed.   Patient encouraged to wear hat for all outdoor exposure.     Lentigo  These are benign sun spots which should be monitored for changes. Daily sun protection will reduce the number of new lesions.   Patient instructed in importance of daily broad-spectrum sunscreen use with SPF of at least 30. Sun avoidance and topical protection/protective clothing discussed.    Multiple benign melanocytic nevi of upper extremity, lower extremity, and trunk  Multiple benign-appearing nevi present on exam today. Reassurance provided. Counseled patient to periodically examine moles and return to clinic if any moles change or become symptomatic.    Neoplasm of uncertain behavior of skin  Recommended biopsies to rule out dysplastic nevi on left upper back and  left buttock. Will defer until after her tubing trip this week.    Family history of nonmelanoma skin cancer  Total body skin examination performed today including at least 12 points as noted in physical examination. Suspicious lesions noted above will need to be biopsied.       Follow-up in about 1 week (around 7/9/2018) for biopsies.

## 2018-07-02 NOTE — LETTER
July 2, 2018      Eleonora Pedroza MD  [address] [facility] [address]           [facility]  [address] [place] [id number]  Phone: [phone]  Fax: [phone]          Patient: Tanmay Boucher   MR Number: [id number]   YOB: 1990   Date of Visit: 7/2/2018       Dear Dr. Eleonora Pedroza:    Thank you for referring Tanmay Boucher to me for evaluation. Attached you will find relevant portions of my assessment and plan of care.    If you have questions, please do not hesitate to call me. I look forward to following Tanmay Boucher along with you.    Sincerely,    Tavia Mcclelland MD    Enclosure  CC:  No Recipients    If you would like to receive this communication electronically, please contact [email or web address] or [phone] to request more information on [employer] Link access.    For providers and/or their staff who would like to refer a patient to [facility], please contact us through our one-stop-shop provider referral line, [facility], [place], at [phone].    If you feel you have received this communication in error or would no longer like to receive these types of communications, please e-mail [email or web address]

## 2018-07-10 ENCOUNTER — OFFICE VISIT (OUTPATIENT)
Dept: DERMATOLOGY | Facility: CLINIC | Age: 28
End: 2018-07-10
Payer: COMMERCIAL

## 2018-07-10 DIAGNOSIS — D48.5 NEOPLASM OF UNCERTAIN BEHAVIOR OF SKIN: Primary | ICD-10-CM

## 2018-07-10 PROCEDURE — 11100 PR BIOPSY OF SKIN LESION: CPT | Mod: S$GLB,,, | Performed by: DERMATOLOGY

## 2018-07-10 PROCEDURE — 88305 TISSUE EXAM BY PATHOLOGIST: CPT | Performed by: PATHOLOGY

## 2018-07-10 PROCEDURE — 11101 PR BIOPSY, EACH ADDED LESION: CPT | Mod: S$GLB,,, | Performed by: DERMATOLOGY

## 2018-07-10 PROCEDURE — 99499 UNLISTED E&M SERVICE: CPT | Mod: S$GLB,,, | Performed by: DERMATOLOGY

## 2018-07-10 NOTE — PROGRESS NOTES
Subjective:       Patient ID:  Tanmay Boucher is a 28 y.o. female who presents for   Chief Complaint   Patient presents with    Mole     back, biopsy      Pt returns today for biopsies of two abnormal moles.       Mole  - Initial  Affected locations: back and left buttock  Duration: 8 years  Signs / symptoms: asymptomatic  Severity: mild  Timing: constant  Aggravated by: nothing  Relieving factors/Treatments tried: nothing  Improvement on treatment: no relief      Review of Systems     Objective:    Physical Exam   Constitutional: She appears well-developed and well-nourished. No distress.   Neurological: She is alert and oriented to person, place, and time. She is not disoriented.   Psychiatric: She has a normal mood and affect.   Skin:   Areas Examined (abnormalities noted in diagram):   Genitals / Buttocks / Groin Inspection Performed  Back Inspection Performed              Diagram Legend     Erythematous scaling macule/papule c/w actinic keratosis       Vascular papule c/w angioma      Pigmented verrucoid papule/plaque c/w seborrheic keratosis      Yellow umbilicated papule c/w sebaceous hyperplasia      Irregularly shaped tan macule c/w lentigo     1-2 mm smooth white papules consistent with Milia      Movable subcutaneous cyst with punctum c/w epidermal inclusion cyst      Subcutaneous movable cyst c/w pilar cyst      Firm pink to brown papule c/w dermatofibroma      Pedunculated fleshy papule(s) c/w skin tag(s)      Evenly pigmented macule c/w junctional nevus     Mildly variegated pigmented, slightly irregular-bordered macule c/w mildly atypical nevus      Flesh colored to evenly pigmented papule c/w intradermal nevus       Pink pearly papule/plaque c/w basal cell carcinoma      Erythematous hyperkeratotic cursted plaque c/w SCC      Surgical scar with no sign of skin cancer recurrence      Open and closed comedones      Inflammatory papules and pustules      Verrucoid papule consistent consistent with wart      Erythematous eczematous patches and plaques     Dystrophic onycholytic nail with subungual debris c/w onychomycosis     Umbilicated papule    Erythematous-base heme-crusted tan verrucoid plaque consistent with inflamed seborrheic keratosis     Erythematous Silvery Scaling Plaque c/w Psoriasis     See annotation      Assessment / Plan:      Pathology Orders:     Normal Orders This Visit    Tissue Specimen To Pathology, Dermatology     Questions:    Directional Terms:  Other(comment)    Clinical information:  r/o dysplastic nevus    Specific Site:  left upper back    Tissue Specimen To Pathology, Dermatology     Questions:    Directional Terms:  Other(comment)    Clinical information:  r/o dysplastic nevus    Specific Site:  left buttock        Neoplasm of uncertain behavior of skin  -     Tissue Specimen To Pathology, Dermatology  -     Tissue Specimen To Pathology, Dermatology    Shave biopsy procedure note x 2:  Risk, benefits, and alternatives of biopsy are discussed with the patient, including risk of infection, scar, recurrence, and need for additional treatment of site. The patient agrees to the procedure by verbal consent. The area is marked and prepped with alcohol.  Approximately 2 mL of lidocaine 1% with epinephrine is used for local anesthesia. A sharp blade is used to remove the lesion. The specimen is sent for pathology. Hemostasis is obtained with aluminum chloride and/or monopolar hyfrecation if needed. The area is then dressed and bandaged. The patient tolerated the procedure well without adverse event. Written instructions on wound care were given and were reviewed with the patient, who is to call for any signs of bleeding or infection. The patient will be notified of the pathology results.      Follow-up in about 1 year (around 7/10/2019) for TBSE, or sooner if any new problems or changing lesions.

## 2018-07-20 ENCOUNTER — TELEPHONE (OUTPATIENT)
Dept: DERMATOLOGY | Facility: CLINIC | Age: 28
End: 2018-07-20

## 2018-07-20 NOTE — TELEPHONE ENCOUNTER
Spoke to pt., bx results given per Dr Mcclelland. Follow up appointment made for October.      ----- Message from Tavia Mcclelland MD sent at 7/19/2018  6:06 PM CDT -----  Needs 3 mo appt to recheck left upper back.  KK    FINAL PATHOLOGIC DIAGNOSIS  [facility] DIAGNOSIS:  1. LEFT UPPER BACK:  COMPOUND DYSPLASTIC NEVUS, MILD ATYPIA.  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  THE LESION INVOLVES THE DEEP MARGIN OF THE BIOPSY.  2. LEFT BUTTOCK:  JUNCTIONAL DYSPLASTIC NEVUS, LENTIGINOUS FEATURES, MILD ATYPIA.  THE MARGINS ARE FREE IN THE CROSS SECTIONS EXAMINED.

## 2018-08-29 ENCOUNTER — PATIENT MESSAGE (OUTPATIENT)
Dept: FAMILY MEDICINE | Facility: CLINIC | Age: 28
End: 2018-08-29

## 2018-08-29 DIAGNOSIS — F43.21 GRIEF REACTION: Primary | ICD-10-CM

## 2018-10-10 ENCOUNTER — OFFICE VISIT (OUTPATIENT)
Dept: FAMILY MEDICINE | Facility: CLINIC | Age: 28
End: 2018-10-10
Payer: COMMERCIAL

## 2018-10-10 VITALS
SYSTOLIC BLOOD PRESSURE: 100 MMHG | TEMPERATURE: 98 F | BODY MASS INDEX: 18.55 KG/M2 | HEIGHT: 67 IN | DIASTOLIC BLOOD PRESSURE: 60 MMHG | RESPIRATION RATE: 16 BRPM | WEIGHT: 118.19 LBS

## 2018-10-10 DIAGNOSIS — N89.8 VAGINAL DISCHARGE: ICD-10-CM

## 2018-10-10 DIAGNOSIS — Z23 NEED FOR PROPHYLACTIC VACCINATION AND INOCULATION AGAINST INFLUENZA: ICD-10-CM

## 2018-10-10 DIAGNOSIS — R30.0 DYSURIA: Primary | ICD-10-CM

## 2018-10-10 LAB
BILIRUB SERPL-MCNC: ABNORMAL MG/DL
BLOOD URINE, POC: ABNORMAL
CANDIDA RRNA VAG QL PROBE: NEGATIVE
COLOR, POC UA: ABNORMAL
G VAGINALIS RRNA GENITAL QL PROBE: POSITIVE
GLUCOSE UR QL STRIP: NORMAL
KETONES UR QL STRIP: ABNORMAL
LEUKOCYTE ESTERASE URINE, POC: ABNORMAL
NITRITE, POC UA: ABNORMAL
PH, POC UA: 8
PROTEIN, POC: ABNORMAL
SPECIFIC GRAVITY, POC UA: 1
T VAGINALIS RRNA GENITAL QL PROBE: NEGATIVE
UROBILINOGEN, POC UA: NORMAL

## 2018-10-10 PROCEDURE — 87660 TRICHOMONAS VAGIN DIR PROBE: CPT

## 2018-10-10 PROCEDURE — 90471 IMMUNIZATION ADMIN: CPT | Mod: S$GLB,,, | Performed by: INTERNAL MEDICINE

## 2018-10-10 PROCEDURE — 99214 OFFICE O/P EST MOD 30 MIN: CPT | Mod: 25,S$GLB,, | Performed by: INTERNAL MEDICINE

## 2018-10-10 PROCEDURE — 99999 PR PBB SHADOW E&M-EST. PATIENT-LVL IV: CPT | Mod: PBBFAC,,, | Performed by: INTERNAL MEDICINE

## 2018-10-10 PROCEDURE — 81002 URINALYSIS NONAUTO W/O SCOPE: CPT | Mod: S$GLB,,, | Performed by: INTERNAL MEDICINE

## 2018-10-10 PROCEDURE — 87491 CHLMYD TRACH DNA AMP PROBE: CPT

## 2018-10-10 PROCEDURE — 90686 IIV4 VACC NO PRSV 0.5 ML IM: CPT | Mod: S$GLB,,, | Performed by: INTERNAL MEDICINE

## 2018-10-10 PROCEDURE — 3008F BODY MASS INDEX DOCD: CPT | Mod: CPTII,S$GLB,, | Performed by: INTERNAL MEDICINE

## 2018-10-10 PROCEDURE — 87086 URINE CULTURE/COLONY COUNT: CPT

## 2018-10-10 NOTE — PROGRESS NOTES
Subjective:        Patient ID: Tanmay Boucher is a 28 y.o. female.    Chief Complaint: Vaginal Discharge; Vaginal Pain; and oainful urination    HPI   Tanmay Boucher presents with c/o abnormal vaginal discharge and dysuria.  Sx started 2 weeks ago and are getting worse.  Pt reports increased vaginal discharge, foul odor.  She has increased urinary urgency and pain at the end of the urinary stream.  She has had regular periods (~q30 days), no abnormal vaginal bleeding, no hematuria.    Pt does not have a hx of frequent yeast or urinary infections.  Last took abx 9 months ago.  No recent treatment with steroids.  She tried discontinuing dairy and coffee in her diet but this did not change the sx.  She has not taken anything for her sx.    Review of Systems  as per HPI      Objective:        Vitals:    10/10/18 0845   BP: 100/60   Resp: 16   Temp: 98.2 °F (36.8 °C)     Physical Exam   Constitutional: She is oriented to person, place, and time. She appears well-developed and well-nourished. No distress.   Genitourinary: Pelvic exam was performed with patient supine. There is no rash, tenderness, lesion or injury on the right labia. There is no rash, tenderness, lesion or injury on the left labia. Cervix exhibits no motion tenderness and no friability. No erythema, tenderness or bleeding in the vagina. No foreign body in the vagina. No signs of injury around the vagina. Vaginal discharge (thin bright yellow discharge) found.   Neurological: She is alert and oriented to person, place, and time.   Skin: Skin is warm and dry.   Vitals reviewed.          Assessment:         1. Dysuria    2. Vaginal discharge    3. Need for prophylactic vaccination and inoculation against influenza              Plan:         Tanmay was seen today for vaginal discharge, vaginal pain and oainful urination.    Diagnoses and all orders for this visit:    Dysuria  -     POCT urine dipstick without microscope  -     Urine culture    Vaginal  discharge  -     C. trachomatis/N. gonorrhoeae by AMP DNA  -     Vaginosis Screen by DNA Probe    Need for prophylactic vaccination and inoculation against influenza  -     Flu Vaccine - Quadrivalent (PF) (3 years & older)    - urine dip shows few leukocytes, RBCs; no nitrites  - Tylenol or NSAIDs PRN  - treatment recommendations pending results          Follow-up for pending lab results.

## 2018-10-10 NOTE — PROGRESS NOTES
Two patient identifiers verified.  Allergies reviewed. Flu vaccine given  IM administered to right deltoid per MD order.  Patient tolerated injection well; no redness, bleeding, or bruising noted to injection site.  Patient instructed to remain in clinic setting for 15 minutes.  Verbalizes understanding.  Flu consent signed by patient. Flu vaccine administered.

## 2018-10-11 ENCOUNTER — PATIENT MESSAGE (OUTPATIENT)
Dept: FAMILY MEDICINE | Facility: CLINIC | Age: 28
End: 2018-10-11

## 2018-10-11 DIAGNOSIS — N76.0 BACTERIAL VAGINOSIS: Primary | ICD-10-CM

## 2018-10-11 DIAGNOSIS — B96.89 BACTERIAL VAGINOSIS: Primary | ICD-10-CM

## 2018-10-11 LAB
BACTERIA UR CULT: NORMAL
C TRACH DNA SPEC QL NAA+PROBE: NOT DETECTED
N GONORRHOEA DNA SPEC QL NAA+PROBE: NOT DETECTED

## 2018-10-11 RX ORDER — METRONIDAZOLE 500 MG/1
500 TABLET ORAL EVERY 12 HOURS
Qty: 14 TABLET | Refills: 0 | Status: SHIPPED | OUTPATIENT
Start: 2018-10-11 | End: 2018-10-19 | Stop reason: ALTCHOICE

## 2018-10-15 ENCOUNTER — OFFICE VISIT (OUTPATIENT)
Dept: DERMATOLOGY | Facility: CLINIC | Age: 28
End: 2018-10-15
Payer: COMMERCIAL

## 2018-10-15 DIAGNOSIS — Z86.018 HISTORY OF DYSPLASTIC NEVUS: ICD-10-CM

## 2018-10-15 DIAGNOSIS — L90.5 SCAR: Primary | ICD-10-CM

## 2018-10-15 PROCEDURE — 99212 OFFICE O/P EST SF 10 MIN: CPT | Mod: S$GLB,,, | Performed by: DERMATOLOGY

## 2018-10-15 NOTE — PROGRESS NOTES
Subjective:       Patient ID:  Tanmay Boucher is a 28 y.o. female who presents for   Chief Complaint   Patient presents with    Mole     L upper back,  follow up     Mole  - Follow-up  Symptom course: stable  Currently using: silicone gel.  Affected locations: back  Signs / symptoms: asymptomatic (no discoloration or darkening in or around scar)      Review of Systems   Musculoskeletal: Negative for joint swelling and arthralgias.   Skin: Positive for daily sunscreen use and recent sunburn.   Hematologic/Lymphatic: Does not bruise/bleed easily.        Objective:    Physical Exam   Constitutional: She appears well-developed and well-nourished. No distress.   Neurological: She is alert and oriented to person, place, and time. She is not disoriented.   Psychiatric: She has a normal mood and affect.   Skin:   Areas Examined (abnormalities noted in diagram):   Back Inspection Performed              Diagram Legend     Erythematous scaling macule/papule c/w actinic keratosis       Vascular papule c/w angioma      Pigmented verrucoid papule/plaque c/w seborrheic keratosis      Yellow umbilicated papule c/w sebaceous hyperplasia      Irregularly shaped tan macule c/w lentigo     1-2 mm smooth white papules consistent with Milia      Movable subcutaneous cyst with punctum c/w epidermal inclusion cyst      Subcutaneous movable cyst c/w pilar cyst      Firm pink to brown papule c/w dermatofibroma      Pedunculated fleshy papule(s) c/w skin tag(s)      Evenly pigmented macule c/w junctional nevus     Mildly variegated pigmented, slightly irregular-bordered macule c/w mildly atypical nevus      Flesh colored to evenly pigmented papule c/w intradermal nevus       Pink pearly papule/plaque c/w basal cell carcinoma      Erythematous hyperkeratotic cursted plaque c/w SCC      Surgical scar with no sign of skin cancer recurrence      Open and closed comedones      Inflammatory papules and pustules      Verrucoid papule consistent  consistent with wart     Erythematous eczematous patches and plaques     Dystrophic onycholytic nail with subungual debris c/w onychomycosis     Umbilicated papule    Erythematous-base heme-crusted tan verrucoid plaque consistent with inflamed seborrheic keratosis     Erythematous Silvery Scaling Plaque c/w Psoriasis     See annotation      Assessment / Plan:        Scar + History of dysplastic nevus  Area(s) of previous dysplastic nevus evaluated with no signs of recurrence. Reassurance provided.  Return to clinic if pigment recurs in the area, e.g. darkening in or around the scar.     Follow-up in about 1 year (around 10/15/2019) for TBSE, or sooner if any new problems or changing lesions.

## 2018-10-17 ENCOUNTER — PATIENT MESSAGE (OUTPATIENT)
Dept: FAMILY MEDICINE | Facility: CLINIC | Age: 28
End: 2018-10-17

## 2018-10-19 ENCOUNTER — OFFICE VISIT (OUTPATIENT)
Dept: FAMILY MEDICINE | Facility: CLINIC | Age: 28
End: 2018-10-19
Payer: COMMERCIAL

## 2018-10-19 VITALS
BODY MASS INDEX: 19.31 KG/M2 | DIASTOLIC BLOOD PRESSURE: 60 MMHG | TEMPERATURE: 98 F | SYSTOLIC BLOOD PRESSURE: 92 MMHG | WEIGHT: 120.13 LBS | RESPIRATION RATE: 16 BRPM | HEIGHT: 66 IN

## 2018-10-19 DIAGNOSIS — R30.0 DYSURIA: Primary | ICD-10-CM

## 2018-10-19 LAB
BILIRUB SERPL-MCNC: ABNORMAL MG/DL
BLOOD URINE, POC: ABNORMAL
COLOR, POC UA: YELLOW
GLUCOSE UR QL STRIP: NORMAL
KETONES UR QL STRIP: ABNORMAL
LEUKOCYTE ESTERASE URINE, POC: ABNORMAL
NITRITE, POC UA: ABNORMAL
PH, POC UA: 6
PROTEIN, POC: ABNORMAL
SPECIFIC GRAVITY, POC UA: 1.01
UROBILINOGEN, POC UA: NORMAL

## 2018-10-19 PROCEDURE — 3008F BODY MASS INDEX DOCD: CPT | Mod: CPTII,S$GLB,, | Performed by: INTERNAL MEDICINE

## 2018-10-19 PROCEDURE — 81002 URINALYSIS NONAUTO W/O SCOPE: CPT | Mod: S$GLB,,, | Performed by: INTERNAL MEDICINE

## 2018-10-19 PROCEDURE — 87088 URINE BACTERIA CULTURE: CPT

## 2018-10-19 PROCEDURE — 99213 OFFICE O/P EST LOW 20 MIN: CPT | Mod: 25,S$GLB,, | Performed by: INTERNAL MEDICINE

## 2018-10-19 PROCEDURE — 99999 PR PBB SHADOW E&M-EST. PATIENT-LVL III: CPT | Mod: PBBFAC,,, | Performed by: INTERNAL MEDICINE

## 2018-10-19 PROCEDURE — 87184 SC STD DISK METHOD PER PLATE: CPT

## 2018-10-19 PROCEDURE — 87077 CULTURE AEROBIC IDENTIFY: CPT

## 2018-10-19 PROCEDURE — 87186 SC STD MICRODIL/AGAR DIL: CPT

## 2018-10-19 PROCEDURE — 87086 URINE CULTURE/COLONY COUNT: CPT

## 2018-10-19 RX ORDER — SULFAMETHOXAZOLE AND TRIMETHOPRIM 800; 160 MG/1; MG/1
1 TABLET ORAL 2 TIMES DAILY
Qty: 6 TABLET | Refills: 0 | Status: SHIPPED | OUTPATIENT
Start: 2018-10-19 | End: 2018-10-22

## 2018-10-19 NOTE — PROGRESS NOTES
Subjective:        Patient ID: Tanmay Boucher is a 28 y.o. female.    Chief Complaint: painful urination    HPI   Tanmay Boucher presents with c/o painful urination.  Pt has completed 1 week of metronidazole for BV; vaginal discharge and sx are resolved.  She reports cloudy kelli colored urine, painful urination that is worst in the AM.  As the day goes on it gets a little better.  She has been taking 3 tbsp of apple cider vinegar with orange juice, cranberry tabs daily.  She had to stop because that with the metronidazole was starting to upset her stomach.  She has been drinking a lot of water.    No fever.    Review of Systems  as per HPI    Objective:        Vitals:    10/19/18 1345   BP: 92/60   Resp: 16   Temp: 98.1 °F (36.7 °C)     Physical Exam   Constitutional: She is oriented to person, place, and time. She appears well-developed and well-nourished. No distress.   Neurological: She is alert and oriented to person, place, and time.   Skin: Skin is warm and dry.   Psychiatric: She has a normal mood and affect. Her behavior is normal.   Vitals reviewed.          Assessment:         1. Dysuria              Plan:         Tanmay was seen today for painful urination.    Diagnoses and all orders for this visit:    Dysuria: Urine dip +leukocytes, RBCs and hazy.  Start empiric Bactrim x 3 days.  Send urine for cx.  Send message if sx of vaginal yeast infection 2/2 abx.  -     sulfamethoxazole-trimethoprim 800-160mg (BACTRIM DS) 800-160 mg Tab; Take 1 tablet by mouth 2 (two) times daily. for 3 days  -     Urine culture  -     POCT urine dipstick without microscope          Follow-up for pending urine culture results.

## 2018-10-22 ENCOUNTER — PATIENT MESSAGE (OUTPATIENT)
Dept: FAMILY MEDICINE | Facility: CLINIC | Age: 28
End: 2018-10-22

## 2018-10-22 DIAGNOSIS — N30.00 ACUTE CYSTITIS WITHOUT HEMATURIA: Primary | ICD-10-CM

## 2018-10-22 RX ORDER — CIPROFLOXACIN 500 MG/1
500 TABLET ORAL EVERY 12 HOURS
Qty: 6 TABLET | Refills: 0 | Status: SHIPPED | OUTPATIENT
Start: 2018-10-22 | End: 2018-10-25

## 2018-10-23 LAB — BACTERIA UR CULT: NORMAL

## 2018-10-29 ENCOUNTER — OFFICE VISIT (OUTPATIENT)
Dept: PSYCHIATRY | Facility: CLINIC | Age: 28
End: 2018-10-29
Payer: COMMERCIAL

## 2018-10-29 DIAGNOSIS — F43.23 ADJUSTMENT DISORDER WITH MIXED ANXIETY AND DEPRESSED MOOD: Primary | ICD-10-CM

## 2018-10-29 PROCEDURE — 90791 PSYCH DIAGNOSTIC EVALUATION: CPT | Mod: S$GLB,,, | Performed by: SOCIAL WORKER

## 2018-10-29 NOTE — PROGRESS NOTES
Psychiatry Initial Visit (PhD/LCSW)  Diagnostic Interview - CPT 17289    Date: 10/29/2018    Site: [facility]    History of present illness: 28 year old female patient presents to the clinic today for initial visit with chief complaint of depression and anxiety.  She reports that she moved to [place] earlier this year from [place], [place].  Shortly after moving to [place], patient reports that her mother  due to complications with liver disease.  Her mother was alcoholic.  Her mother lived in [place].  Her divorce was also finalized (a process which had begun in 2016).  Patient reports that this has been an adjustment.  Has been a grieving process for her.  Feels like she has a history of fear of abandonment.  She is busy with school and work.  Not prescribed psychotropic medications.  snf through session, patient informs Sw that she doesn't think that therapy could be beneficial to her in this context.  She excuses herself from session.  Sw encouraged her to pursue therapeutic services.            Clinical status of patient: Outpatient    Tanmay Boucher, a 28 y.o. female, for initial evaluation visit.  Met with patient.    Chief complaint/reason for encounter: depression and anxiety    Referral Source: self-referral     Pain: noncontributory    Symptoms:   · Mood: has periods when she feels down    · Anxiety: excessive anxiety/worry  · Substance abuse: denied  · Cognitive functioning: denied  · Health behaviors: noncontributory    Psychiatric history: has participated in counseling/psychotherapy on an outpatient basis in the past    Medical history: noncontributory    Family history of psychiatric illness: patient's mother was alcoholic     Social history (marriage, employment, etc.): Patient is .  No children.  Lives with female roommate.  Parents were .  Patient's mother is .  Patient's father lives in [place].  One older sister.  Patient is a student at [employer].  Works  at a coffee shop, and as a dancer.        Substance use:   Alcohol: infrequent   Drugs: none   Tobacco: none   Caffeine: infrequent            Current medications and drug reactions (include OTC, herbal): see medication list     Strengths and liabilities: Strength: Patient is expressive/articulate., Strength: Patient is physically healthy., Liability: Patient lacks coping skills.    Current Evaluation:     Mental Status Exam:  General Appearance:  unremarkable, age appropriate   Speech: normal tone, normal rate, normal pitch, normal volume      Level of Cooperation: guarded      Thought Processes: normal and logical   Mood: a little anxious       Thought Content: normal, no suicidality, no homicidality, delusions, or paranoia   Affect: guarded   Orientation: Oriented x3   Memory: recent >  intact, remote >  intact   Attention Span & Concentration: intact   Fund of General Knowledge: intact and appropriate to age and level of education   Abstract Reasoning: not assessed    Judgment & Insight: intact     Language  intact     Diagnostic Impression - Plan:       ICD-10-CM ICD-9-CM   1. Adjustment disorder with mixed anxiety and depressed mood F43.23 309.28       Plan:individual psychotherapy    Return to Clinic: as scheduled    Length of Service (minutes): 45

## 2021-04-14 ENCOUNTER — TELEPHONE (OUTPATIENT)
Dept: DERMATOLOGY | Facility: CLINIC | Age: 31
End: 2021-04-14

## 2021-04-28 ENCOUNTER — PATIENT MESSAGE (OUTPATIENT)
Dept: RESEARCH | Facility: HOSPITAL | Age: 31
End: 2021-04-28

## 2023-03-09 ENCOUNTER — OFFICE VISIT (OUTPATIENT)
Dept: CARDIOLOGY | Facility: CLINIC | Age: 33
End: 2023-03-09
Payer: COMMERCIAL

## 2023-03-09 VITALS
BODY MASS INDEX: 19.68 KG/M2 | WEIGHT: 121.88 LBS | SYSTOLIC BLOOD PRESSURE: 98 MMHG | HEART RATE: 80 BPM | DIASTOLIC BLOOD PRESSURE: 62 MMHG | OXYGEN SATURATION: 98 %

## 2023-03-09 DIAGNOSIS — R07.2 PRECORDIAL PAIN: ICD-10-CM

## 2023-03-09 DIAGNOSIS — R00.2 PALPITATIONS: Primary | ICD-10-CM

## 2023-03-09 DIAGNOSIS — I87.2 VENOUS INSUFFICIENCY: ICD-10-CM

## 2023-03-09 PROCEDURE — 1159F MED LIST DOCD IN RCRD: CPT | Mod: CPTII,S$GLB,, | Performed by: INTERNAL MEDICINE

## 2023-03-09 PROCEDURE — 3078F DIAST BP <80 MM HG: CPT | Mod: CPTII,S$GLB,, | Performed by: INTERNAL MEDICINE

## 2023-03-09 PROCEDURE — 3074F SYST BP LT 130 MM HG: CPT | Mod: CPTII,S$GLB,, | Performed by: INTERNAL MEDICINE

## 2023-03-09 PROCEDURE — 93010 ELECTROCARDIOGRAM REPORT: CPT | Mod: S$GLB,,, | Performed by: INTERNAL MEDICINE

## 2023-03-09 PROCEDURE — 1160F PR REVIEW ALL MEDS BY PRESCRIBER/CLIN PHARMACIST DOCUMENTED: ICD-10-PCS | Mod: CPTII,S$GLB,, | Performed by: INTERNAL MEDICINE

## 2023-03-09 PROCEDURE — 1159F PR MEDICATION LIST DOCUMENTED IN MEDICAL RECORD: ICD-10-PCS | Mod: CPTII,S$GLB,, | Performed by: INTERNAL MEDICINE

## 2023-03-09 PROCEDURE — 99999 PR PBB SHADOW E&M-EST. PATIENT-LVL III: ICD-10-PCS | Mod: PBBFAC,,, | Performed by: INTERNAL MEDICINE

## 2023-03-09 PROCEDURE — 99204 PR OFFICE/OUTPT VISIT, NEW, LEVL IV, 45-59 MIN: ICD-10-PCS | Mod: 25,S$GLB,, | Performed by: INTERNAL MEDICINE

## 2023-03-09 PROCEDURE — 3008F PR BODY MASS INDEX (BMI) DOCUMENTED: ICD-10-PCS | Mod: CPTII,S$GLB,, | Performed by: INTERNAL MEDICINE

## 2023-03-09 PROCEDURE — 3078F PR MOST RECENT DIASTOLIC BLOOD PRESSURE < 80 MM HG: ICD-10-PCS | Mod: CPTII,S$GLB,, | Performed by: INTERNAL MEDICINE

## 2023-03-09 PROCEDURE — 3008F BODY MASS INDEX DOCD: CPT | Mod: CPTII,S$GLB,, | Performed by: INTERNAL MEDICINE

## 2023-03-09 PROCEDURE — 1160F RVW MEDS BY RX/DR IN RCRD: CPT | Mod: CPTII,S$GLB,, | Performed by: INTERNAL MEDICINE

## 2023-03-09 PROCEDURE — 93010 EKG 12-LEAD: ICD-10-PCS | Mod: S$GLB,,, | Performed by: INTERNAL MEDICINE

## 2023-03-09 PROCEDURE — 99999 PR PBB SHADOW E&M-EST. PATIENT-LVL III: CPT | Mod: PBBFAC,,, | Performed by: INTERNAL MEDICINE

## 2023-03-09 PROCEDURE — 3074F PR MOST RECENT SYSTOLIC BLOOD PRESSURE < 130 MM HG: ICD-10-PCS | Mod: CPTII,S$GLB,, | Performed by: INTERNAL MEDICINE

## 2023-03-09 PROCEDURE — 93005 ELECTROCARDIOGRAM TRACING: CPT

## 2023-03-09 PROCEDURE — 99204 OFFICE O/P NEW MOD 45 MIN: CPT | Mod: 25,S$GLB,, | Performed by: INTERNAL MEDICINE

## 2023-03-09 NOTE — PROGRESS NOTES
[facility]    Chief Complaint  Chief Complaint   Patient presents with    Palpitations    Chest Pain       HPI:    Patient presents for evaluation of palpitations and chest discomfort.  Chest discomfort has been occurring for many years.  Had a previous cardiac workup and was told that she had costochondritis.  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  Chest pain has persisted.  It now radiates into her axilla.  No aggravating or alleviating factors have been noted.  Is there most of the time.  No prior cardiac testing.    She also is concerned about palpitations.  Will present a few months ago.  Would feel like her heart would stop her skip a beat.  A cough when it happened.  Could occur several times per day.  Those symptoms have resolved.  No aggravating or alleviating factors were noted.  No syncope or presyncope.  She remains active.  She works as an occupational therapist at [employer].    Medications  Current Outpatient Medications   Medication Sig Dispense Refill    multivitamin (THERAGRAN) per tablet Take 1 tablet by mouth once daily.       No current facility-administered medications for this visit.        History  Past Medical History:   Diagnosis Date    ASCUS of cervix with negative high risk HPV 6/14/2018    Repeat cotesting in 2021    Asthma     Dysmenorrhea 5/23/2018    History of ovarian cyst 5/23/2018    Vitamin D deficiency 5/23/2018     Past Surgical History:   Procedure Laterality Date    FRACTURE SURGERY Left 1997    arm    WISDOM TOOTH EXTRACTION  01/2018 2/2 infection     Social History     Socioeconomic History    Marital status: Single   Tobacco Use    Smoking status: Never    Smokeless tobacco: Never   Substance and Sexual Activity    Alcohol use: Yes     Comment: rarely    Sexual activity: Yes   Other Topics Concern    Are you pregnant or think you may be? No    Breast-feeding No   Social History Narrative    Artist; completing pre-reqs for OT school  - planning to apply to [employer] program    Previously ; no children    Regular exercise     Family History   Problem Relation Age of Onset    Heart attack Mother     Cirrhosis Mother     Heart disease Mother     Hypertension Mother     Skin cancer Father     Other Sister         autoimmune dz: ?FM; dissociative states    Endometriosis Sister     Bipolar disorder Sister         1    Panic disorder Sister     Atrial fibrillation Maternal Grandmother     Stroke Maternal Grandmother     Cancer Maternal Grandfather 80        colon    Melanoma Neg Hx         Allergies  Review of patient's allergies indicates:   Allergen Reactions    Lactose     Vicodin [hydrocodone-acetaminophen] Nausea And Vomiting       Review of Systems   Review of Systems   Constitutional: Negative for malaise/fatigue, weight gain and weight loss.   Eyes:  Negative for visual disturbance.   Cardiovascular:  Positive for chest pain, leg swelling and palpitations. Negative for claudication, cyanosis, dyspnea on exertion, irregular heartbeat, near-syncope, orthopnea, paroxysmal nocturnal dyspnea and syncope.   Respiratory:  Negative for cough, hemoptysis, shortness of breath, sleep disturbances due to breathing and wheezing.    Hematologic/Lymphatic: Negative for bleeding problem. Does not bruise/bleed easily.   Skin:  Negative for poor wound healing.   Musculoskeletal:  Negative for muscle cramps and myalgias.   Gastrointestinal:  Negative for abdominal pain, anorexia, diarrhea, heartburn, hematemesis, hematochezia, melena, nausea and vomiting.   Genitourinary:  Negative for hematuria and nocturia.   Neurological:  Negative for excessive daytime sleepiness, dizziness, focal weakness, light-headedness and weakness.     Physical Exam  Vitals:    03/09/23 1508   BP: 98/62   Pulse: 80     Wt Readings from Last 1 Encounters:   03/09/23 55.3 kg (121 lb 14.4 oz)     Physical Exam  Vitals and nursing note reviewed.   Constitutional:       General: She is not  in acute distress.     Appearance: She is not toxic-appearing or diaphoretic.   HENT:      Head: Normocephalic and atraumatic.      Mouth/Throat:      Lips: Pink.      Mouth: Mucous membranes are moist.   Eyes:      General: No scleral icterus.     Conjunctiva/sclera: Conjunctivae normal.   Neck:      Thyroid: No thyromegaly.      Vascular: No carotid bruit, hepatojugular reflux or JVD.      Trachea: Trachea normal.   Cardiovascular:      Rate and Rhythm: Normal rate and regular rhythm. No extrasystoles are present.     Chest Wall: PMI is not displaced.      Pulses:           Carotid pulses are 2+ on the right side and 2+ on the left side.       Radial pulses are 2+ on the right side and 2+ on the left side.        Dorsalis pedis pulses are 2+ on the right side and 2+ on the left side.        Posterior tibial pulses are 2+ on the right side and 2+ on the left side.      Heart sounds: S1 normal and S2 normal. No murmur heard.    No friction rub. No S3 or S4 sounds.   Pulmonary:      Effort: Pulmonary effort is normal. No accessory muscle usage or respiratory distress.      Breath sounds: Normal breath sounds and air entry. No decreased breath sounds, wheezing, rhonchi or rales.   Abdominal:      General: Bowel sounds are normal. There is no distension or abdominal bruit.      Palpations: Abdomen is soft. There is no hepatomegaly, splenomegaly or pulsatile mass.      Tenderness: There is no abdominal tenderness.   Musculoskeletal:         General: No tenderness or deformity.      Right lower leg: No edema.      Left lower leg: No edema.   Skin:     General: Skin is warm and dry.      Capillary Refill: Capillary refill takes less than 2 seconds.      Coloration: Skin is not cyanotic or pale.      Nails: There is no clubbing.   Neurological:      General: No focal deficit present.      Mental Status: She is alert and oriented to person, place, and time.   Psychiatric:         Attention and Perception: Attention normal.          Mood and Affect: Mood normal.         Speech: Speech normal.         Behavior: Behavior normal. Behavior is cooperative.       Labs  No visits with results within 6 Month(s) from this visit.   Latest known visit with results is:   Office Visit on 10/19/2018   Component Date Value Ref Range Status    Urine Culture, Routine 10/19/2018    Final                    Value:ESCHERICHIA COLI  >100,000 cfu/ml      Color, UA 10/19/2018 yellow   Final    Spec Grav UA 10/19/2018 1.010   Final    pH, UA 10/19/2018 6   Final    WBC, UA 10/19/2018 ++   Final    Nitrite, UA 10/19/2018 neg   Final    Protein, POC 10/19/2018 trace   Final    Glucose, UA 10/19/2018 normal   Final    Ketones, UA 10/19/2018 neg   Final    Urobilinogen, UA 10/19/2018 normal   Final    Bilirubin, POC 10/19/2018 neg   Final    Blood, UA 10/19/2018 about 250   Final       Imaging  No results found.    Assessment  1. Palpitations  Likely benign ectopy  - Cardiac event monitor; Future    2. Precordial pain  Sounds musculoskeletal  - IN OFFICE EKG 12-LEAD (to Muse)  - Exercise Stress - EKG; Future    3. Venous insufficiency  Request referral for evaluation  - Ambulatory referral/consult to [facility]; Future      Plan and Discussion    Workup as above with further planning based on those results    The ASCVD Risk score (Bro[place] DK, et al., 2019) failed to calculate for the following reasons:    The 2019 ASCVD risk score is only valid for ages 40 to 79     Follow Up  Follow up for after testing.      Figueroa Gilliland MD

## 2023-03-10 ENCOUNTER — PATIENT MESSAGE (OUTPATIENT)
Dept: ADMINISTRATIVE | Facility: OTHER | Age: 33
End: 2023-03-10
Payer: COMMERCIAL

## 2023-03-13 ENCOUNTER — HOSPITAL ENCOUNTER (OUTPATIENT)
Dept: CARDIOLOGY | Facility: OTHER | Age: 33
Discharge: HOME | End: 2023-03-13
Attending: INTERNAL MEDICINE
Payer: COMMERCIAL

## 2023-03-13 VITALS
WEIGHT: 121 LBS | HEART RATE: 71 BPM | HEIGHT: 66 IN | SYSTOLIC BLOOD PRESSURE: 106 MMHG | BODY MASS INDEX: 19.44 KG/M2 | DIASTOLIC BLOOD PRESSURE: 64 MMHG

## 2023-03-13 DIAGNOSIS — R07.2 PRECORDIAL PAIN: ICD-10-CM

## 2023-03-13 LAB
CV STRESS BASE HR: 71 BPM
DIASTOLIC BLOOD PRESSURE: 64 MMHG
OHS CV CPX 85 PERCENT MAX PREDICTED HEART RATE MALE: 151
OHS CV CPX ESTIMATED METS: 17
OHS CV CPX MAX PREDICTED HEART RATE: 178
OHS CV CPX PATIENT IS FEMALE: 1
OHS CV CPX PATIENT IS MALE: 0
OHS CV CPX PEAK DIASTOLIC BLOOD PRESSURE: 68 MMHG
OHS CV CPX PEAK HEAR RATE: 190 BPM
OHS CV CPX PEAK RATE PRESSURE PRODUCT: NORMAL
OHS CV CPX PEAK SYSTOLIC BLOOD PRESSURE: 140 MMHG
OHS CV CPX PERCENT MAX PREDICTED HEART RATE ACHIEVED: 107
OHS CV CPX RATE PRESSURE PRODUCT PRESENTING: 7526
STRESS ECHO POST EXERCISE DUR MIN: 13 MINUTES
STRESS ECHO POST EXERCISE DUR SEC: 30 SECONDS
SYSTOLIC BLOOD PRESSURE: 106 MMHG

## 2023-03-13 PROCEDURE — 93018 CV STRESS TEST I&R ONLY: CPT | Mod: ,,, | Performed by: INTERNAL MEDICINE

## 2023-03-13 PROCEDURE — 93017 CV STRESS TEST TRACING ONLY: CPT

## 2023-03-13 PROCEDURE — 93018 EXERCISE STRESS - EKG (CUPID ONLY): ICD-10-PCS | Mod: ,,, | Performed by: INTERNAL MEDICINE

## 2023-03-13 PROCEDURE — 93016 CV STRESS TEST SUPVJ ONLY: CPT | Mod: ,,, | Performed by: INTERNAL MEDICINE

## 2023-03-13 PROCEDURE — 93016 EXERCISE STRESS - EKG (CUPID ONLY): ICD-10-PCS | Mod: ,,, | Performed by: INTERNAL MEDICINE

## 2023-04-18 DIAGNOSIS — I83.893 VARICOSE VEINS OF LEG WITH EDEMA, BILATERAL: Primary | ICD-10-CM

## 2023-07-22 ENCOUNTER — HOSPITAL ENCOUNTER (EMERGENCY)
Facility: OTHER | Age: 33
Discharge: HOME | End: 2023-07-22
Attending: EMERGENCY MEDICINE
Payer: COMMERCIAL

## 2023-07-22 VITALS
OXYGEN SATURATION: 100 % | HEIGHT: 67 IN | TEMPERATURE: 98 F | DIASTOLIC BLOOD PRESSURE: 60 MMHG | HEART RATE: 62 BPM | SYSTOLIC BLOOD PRESSURE: 97 MMHG | BODY MASS INDEX: 18.83 KG/M2 | RESPIRATION RATE: 16 BRPM | WEIGHT: 120 LBS
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
## 2023-07-22 DIAGNOSIS — S06.0X0A CONCUSSION WITHOUT LOSS OF CONSCIOUSNESS, INITIAL ENCOUNTER: ICD-10-CM

## 2023-07-22 DIAGNOSIS — S16.1XXA CERVICAL STRAIN, ACUTE, INITIAL ENCOUNTER: ICD-10-CM

## 2023-07-22 LAB
B-HCG UR QL: NEGATIVE
CTP QC/QA: YES

## 2023-07-22 PROCEDURE — 81025 URINE PREGNANCY TEST: CPT | Performed by: PHYSICIAN ASSISTANT

## 2023-07-22 PROCEDURE — 99284 EMERGENCY DEPT VISIT MOD MDM: CPT | Mod: 25

## 2023-07-22 RX ORDER — METHOCARBAMOL 500 MG/1
1000 TABLET, FILM COATED ORAL 3 TIMES DAILY PRN
Qty: 18 TABLET | Refills: 0 | Status: SHIPPED | OUTPATIENT
Start: 2023-07-22 | End: 2023-07-27
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
## 2023-07-22 NOTE — Clinical Note
"Tanmay Bhagatana paula Boucher was seen and treated in our emergency department on 7/22/2023.  She may return to work on 07/26/2023.       If you have any questions or concerns, please don't hesitate to call.      Brian Moon PA-C"
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
